# Patient Record
Sex: FEMALE | Race: WHITE
[De-identification: names, ages, dates, MRNs, and addresses within clinical notes are randomized per-mention and may not be internally consistent; named-entity substitution may affect disease eponyms.]

---

## 2018-01-17 ENCOUNTER — HOSPITAL ENCOUNTER (OUTPATIENT)
Dept: HOSPITAL 76 - EMS | Age: 78
Discharge: ANOTHER HOSPITAL | End: 2018-01-17
Attending: SURGERY
Payer: MEDICARE

## 2018-01-17 ENCOUNTER — HOSPITAL ENCOUNTER (INPATIENT)
Dept: HOSPITAL 76 - ED | Age: 78
LOS: 4 days | DRG: 192 | End: 2018-01-21
Attending: FAMILY MEDICINE | Admitting: FAMILY MEDICINE
Payer: MEDICARE

## 2018-01-17 DIAGNOSIS — J44.1: ICD-10-CM

## 2018-01-17 DIAGNOSIS — F17.210: ICD-10-CM

## 2018-01-17 DIAGNOSIS — R06.09: ICD-10-CM

## 2018-01-17 DIAGNOSIS — I48.91: ICD-10-CM

## 2018-01-17 DIAGNOSIS — Z66: ICD-10-CM

## 2018-01-17 DIAGNOSIS — F41.8: ICD-10-CM

## 2018-01-17 DIAGNOSIS — Z79.51: ICD-10-CM

## 2018-01-17 DIAGNOSIS — J44.9: Primary | ICD-10-CM

## 2018-01-17 DIAGNOSIS — R06.02: Primary | ICD-10-CM

## 2018-01-17 DIAGNOSIS — Z79.899: ICD-10-CM

## 2018-01-17 LAB
ALBUMIN DIAFP-MCNC: 4 G/DL (ref 3.2–5.5)
ALBUMIN/GLOB SERPL: 1.3 {RATIO} (ref 1–2.2)
ALP SERPL-CCNC: 88 IU/L (ref 42–121)
ALT SERPL W P-5'-P-CCNC: 68 IU/L (ref 10–60)
ANION GAP SERPL CALCULATED.4IONS-SCNC: 12 MMOL/L (ref 6–13)
ARTERIAL PATENCY WRIST A: POSITIVE
ARTERIAL PATENCY WRIST A: POSITIVE
AST SERPL W P-5'-P-CCNC: 84 IU/L (ref 10–42)
BASE EXCESS BLDMV CALC-SCNC: -3.9 MMOL/L (ref -2–3)
BASE EXCESS BLDMV CALC-SCNC: -5.2 MMOL/L (ref -2–3)
BASOPHILS NFR BLD AUTO: 0.2 10^3/UL (ref 0–0.1)
BASOPHILS NFR BLD AUTO: 2.5 %
BILIRUB BLD-MCNC: 0.5 MG/DL (ref 0.2–1)
BUN SERPL-MCNC: 13 MG/DL (ref 6–20)
CALCIUM UR-MCNC: 9 MG/DL (ref 8.5–10.3)
CHLORIDE SERPL-SCNC: 99 MMOL/L (ref 101–111)
CO2 BLDA CALC-SCNC: 27.6 MMOL/L (ref 21–29)
CO2 BLDA CALC-SCNC: 31.5 MMOL/L (ref 21–29)
CO2 SERPL-SCNC: 27 MMOL/L (ref 21–32)
CREAT SERPLBLD-SCNC: 0.7 MG/DL (ref 0.4–1)
DEPRECATED HCO3 PLAS-SCNC: 25.5 MMOL/L (ref 22–26)
DEPRECATED HCO3 PLAS-SCNC: 28.3 MMOL/L (ref 22–26)
EOSINOPHIL # BLD AUTO: 0.1 10^3/UL (ref 0–0.7)
EOSINOPHIL NFR BLD AUTO: 0.6 %
ERYTHROCYTE [DISTWIDTH] IN BLOOD BY AUTOMATED COUNT: 13.7 % (ref 12–15)
GFRSERPLBLD MDRD-ARVRAT: 81 ML/MIN/{1.73_M2} (ref 89–?)
GLOBULIN SER-MCNC: 3.2 G/DL (ref 2.1–4.2)
GLUCOSE SERPL-MCNC: 150 MG/DL (ref 70–100)
HGB UR QL STRIP: 15.2 G/DL (ref 12–16)
LIPASE SERPL-CCNC: 15 U/L (ref 22–51)
LYMPHOCYTES # SPEC AUTO: 1.1 10^3/UL (ref 1.5–3.5)
LYMPHOCYTES NFR BLD AUTO: 12.2 %
MCH RBC QN AUTO: 31.7 PG (ref 27–31)
MCHC RBC AUTO-ENTMCNC: 31.9 G/DL (ref 32–36)
MCV RBC AUTO: 99.3 FL (ref 81–99)
MONOCYTES # BLD AUTO: 0.4 10^3/UL (ref 0–1)
MONOCYTES NFR BLD AUTO: 4.8 %
NEUTROPHILS # BLD AUTO: 7 10^3/UL (ref 1.5–6.6)
NEUTROPHILS # SNV AUTO: 8.8 X10^3/UL (ref 4.8–10.8)
NEUTROPHILS NFR BLD AUTO: 79.9 %
PCO2 TEMP ADJ BLDCOA: 103 MMHG (ref 34–45)
PCO2 TEMP ADJ BLDCOA: 67 MMHG (ref 34–45)
PDW BLD AUTO: 7.7 FL (ref 7.9–10.8)
PH TEMP ADJ BLDA: 7.06 [PH] (ref 7.35–7.45)
PH TEMP ADJ BLDA: 7.2 [PH] (ref 7.35–7.45)
PLATELET # BLD: 187 10^3/UL (ref 130–450)
PO2 TEMP ADJ BLDCOA: 144 MMHG (ref 80–100)
PO2 TEMP ADJ BLDCOA: 77 MMHG (ref 80–100)
PROT SPEC-MCNC: 7.2 G/DL (ref 6.7–8.2)
RBC MAR: 4.8 10^6/UL (ref 4.2–5.4)
SAO2 % BLDA FROM PO2: 91 % (ref 94–98)
SAO2 % BLDA FROM PO2: 99 % (ref 94–98)
SODIUM SERPLBLD-SCNC: 138 MMOL/L (ref 135–145)

## 2018-01-17 PROCEDURE — 96367 TX/PROPH/DG ADDL SEQ IV INF: CPT

## 2018-01-17 PROCEDURE — 83615 LACTATE (LD) (LDH) ENZYME: CPT

## 2018-01-17 PROCEDURE — 36415 COLL VENOUS BLD VENIPUNCTURE: CPT

## 2018-01-17 PROCEDURE — 96361 HYDRATE IV INFUSION ADD-ON: CPT

## 2018-01-17 PROCEDURE — 99284 EMERGENCY DEPT VISIT MOD MDM: CPT

## 2018-01-17 PROCEDURE — 87150 DNA/RNA AMPLIFIED PROBE: CPT

## 2018-01-17 PROCEDURE — 84100 ASSAY OF PHOSPHORUS: CPT

## 2018-01-17 PROCEDURE — 80048 BASIC METABOLIC PNL TOTAL CA: CPT

## 2018-01-17 PROCEDURE — 96375 TX/PRO/DX INJ NEW DRUG ADDON: CPT

## 2018-01-17 PROCEDURE — 84484 ASSAY OF TROPONIN QUANT: CPT

## 2018-01-17 PROCEDURE — 85025 COMPLETE CBC W/AUTO DIFF WBC: CPT

## 2018-01-17 PROCEDURE — 36600 WITHDRAWAL OF ARTERIAL BLOOD: CPT

## 2018-01-17 PROCEDURE — 80053 COMPREHEN METABOLIC PANEL: CPT

## 2018-01-17 PROCEDURE — 83735 ASSAY OF MAGNESIUM: CPT

## 2018-01-17 PROCEDURE — 81001 URINALYSIS AUTO W/SCOPE: CPT

## 2018-01-17 PROCEDURE — 82310 ASSAY OF CALCIUM: CPT

## 2018-01-17 PROCEDURE — 94640 AIRWAY INHALATION TREATMENT: CPT

## 2018-01-17 PROCEDURE — 71045 X-RAY EXAM CHEST 1 VIEW: CPT

## 2018-01-17 PROCEDURE — 87086 URINE CULTURE/COLONY COUNT: CPT

## 2018-01-17 PROCEDURE — 82803 BLOOD GASES ANY COMBINATION: CPT

## 2018-01-17 PROCEDURE — 93005 ELECTROCARDIOGRAM TRACING: CPT

## 2018-01-17 PROCEDURE — 83690 ASSAY OF LIPASE: CPT

## 2018-01-17 PROCEDURE — 96365 THER/PROPH/DIAG IV INF INIT: CPT

## 2018-01-17 PROCEDURE — 81003 URINALYSIS AUTO W/O SCOPE: CPT

## 2018-01-17 PROCEDURE — 94660 CPAP INITIATION&MGMT: CPT

## 2018-01-17 PROCEDURE — 83880 ASSAY OF NATRIURETIC PEPTIDE: CPT

## 2018-01-17 RX ADMIN — IPRATROPIUM BROMIDE PRN MG: 0.5 SOLUTION RESPIRATORY (INHALATION) at 16:45

## 2018-01-17 RX ADMIN — POTASSIUM CHLORIDE, DEXTROSE MONOHYDRATE AND SODIUM CHLORIDE SCH MLS/HR: 150; 5; 450 INJECTION, SOLUTION INTRAVENOUS at 13:56

## 2018-01-17 RX ADMIN — POTASSIUM CHLORIDE, DEXTROSE MONOHYDRATE AND SODIUM CHLORIDE SCH MLS/HR: 150; 5; 450 INJECTION, SOLUTION INTRAVENOUS at 23:50

## 2018-01-17 RX ADMIN — SODIUM CHLORIDE, PRESERVATIVE FREE SCH ML: 5 INJECTION INTRAVENOUS at 14:02

## 2018-01-17 RX ADMIN — METHYLPREDNISOLONE SODIUM SUCCINATE SCH MG: 40 INJECTION, POWDER, FOR SOLUTION INTRAMUSCULAR; INTRAVENOUS at 18:31

## 2018-01-17 RX ADMIN — SODIUM CHLORIDE, PRESERVATIVE FREE SCH ML: 5 INJECTION INTRAVENOUS at 22:22

## 2018-01-17 RX ADMIN — METHYLPREDNISOLONE SODIUM SUCCINATE SCH MG: 40 INJECTION, POWDER, FOR SOLUTION INTRAMUSCULAR; INTRAVENOUS at 23:50

## 2018-01-17 NOTE — XRAY REPORT
EXAM: 

CHEST RADIOGRAPHY

 

EXAM DATE: 1/17/2018 10:58 AM.

 

CLINICAL HISTORY: Short of breath

 

COMPARISON: Chest CT 07/24/2015

 

TECHNIQUE: 1 view.

 

FINDINGS:

Lungs/Pleura: Nonspecific increase in interstitial markings. Hyperexpanded lungs. Faint nodular densi
ty right midlung projected over the sixth anterior rib. Question nodular density over the left sevent
h anterior rib.

 

Mediastinum: Within exam limitations, the cardiomediastinal contour is normal.

 

Other: Question prior left breast surgery

 

IMPRESSION: Hyperexpanded lungs consistent with COPD. Nonspecific increase in interstitial markings. 
Question bilateral lung nodules. Suggest repeat 2 view chest x-ray with bilateral nipple markers

 

[facility]

Referring Provider Line: [phone]

 

SITE ID: [id number]

## 2018-01-17 NOTE — ED PHYSICIAN DOCUMENTATION
PD HPI DYSPNEA





- Stated complaint


Stated Complaint: SOA





- Chief complaint


Chief Complaint: Resp





- History obtained from


History obtained from: Patient, EMS





- History of Present Illness


Timing - onset: How many days ago (4)


Timing - onset during: Rest


Timing - duration: Days (4)


Timing - details: Gradual onset, Still present


Inciting event(s): URI


Improved by: O2, Inhaler/neb, Rest


Worsened by: Exertion, Coughing


Associated symptoms: Cough, Wheezing, Chest pain / discomfort


Similar symptoms before: Diagnosis (COPD)


Recently seen: Not recently seen





- Additional information


Additional information: 





77-year-old female with a history of COPD maintained on Spiriva and Advair has 

developed increasing shortness of breath over the past 4 days with a URI.  She 

is coughing up some phlegm that is tinged with yellow and she has not had fever.





Review of Systems


Constitutional: reports: Fatigue.  denies: Fever


Eyes: denies: Decreased vision


Ears: denies: Ear pain


Nose: reports: Rhinorrhea / runny nose, Congestion


Throat: denies: Sore throat


Cardiac: reports: Chest pain / pressure.  denies: Palpitations


Respiratory: reports: Dyspnea, Cough, Wheezing


GI: denies: Abdominal Pain, Nausea, Vomiting


: denies: Dysuria, Frequency


Skin: denies: Rash


Musculoskeletal: denies: Neck pain, Back pain, Extremity pain





PD PAST MEDICAL HISTORY





- Present Medications


Home Medications: 


 Ambulatory Orders











 Medication  Instructions  Recorded  Confirmed


 


Citalopram Hydrobromide [Celexa] 20 mg PO DAILY 01/17/18 01/17/18


 


Fluticasone/Salmeterol [Advair 1 puffs PO BID 01/17/18 01/17/18





500-50 Diskus]   


 


Ipratropium Bromide [Atrovent Hfa] 2 puffs INH Q6H 01/17/18 01/17/18


 


LORazepam [Lorazepam] 0.5 - 1 mg PO QPM PRN 01/17/18 01/17/18


 


Tiotropium Bromide [Spiriva] 1 puffs PO DAILY 01/17/18 01/17/18














- Allergies


Allergies/Adverse Reactions: 


 Allergies











Allergy/AdvReac Type Severity Reaction Status Date / Time


 


codeine Allergy Unknown Headache Verified 01/17/18 09:56


 


diazepam [From Valium] Allergy  Respiratory Verified 01/17/18 09:56














PD ED PE NORMAL





- Vitals


Vital signs reviewed: Yes (Tachycardic tachypneic and hypertensive)





- General


General: Well developed/nourished, Other (Thin 77-year-old female sitting 

upright tachypneic with a oxygen mask in place appears anxious.)





- HEENT


HEENT: Atraumatic, PERRL, EOMI, Other (Both TMs are mildly inflamed with some 

distortion of the landmarks.)





- Neck


Neck: Supple, no meningeal sign, No bony TTP





- Cardiac


Cardiac: Other (Tachycardic with 1 out of 6 holosystolic murmur at the left 

sternal border)





- Respiratory


Respiratory: Other (Tachypneic at rest with diminished breath sounds and 

wheezes throughout high-pitched.)





- Abdomen


Abdomen: Soft, Non tender





- Back


Back: No CVA TTP, No spinal TTP





- Derm


Derm: Normal color, Warm and dry, No rash





- Extremities


Extremities: No deformity, No edema





- Neuro


Neuro: No motor deficit, No sensory deficit


Eye Opening: Spontaneous


Motor: Obeys Commands


Verbal: Oriented


GCS Score: 15





- Psych


Psych: Normal mood, Normal affect





Results





- Vitals


Vitals: 


 Vital Signs - 24 hr











  01/17/18 01/17/18 01/17/18





  09:48 11:02 11:04


 


Temperature 36.8 C  


 


Heart Rate 129 H 139 H 105 H


 


Respiratory 28 H 26 H 29 H





Rate   


 


Blood Pressure 142/109 H 133/77 H 120/72


 


O2 Saturation 100 94 92














  01/17/18 01/17/18





  12:06 12:30


 


Temperature  


 


Heart Rate 102 H 97


 


Respiratory 25 H 17





Rate  


 


Blood Pressure 84/60 L 83/53 L


 


O2 Saturation 96 98








 Oxygen











O2 Source                      Room air

















- EKG (time done)


  ** 1045


Rate: Rate (enter#) (150)


Rhythm: SVT


QRS: Low voltage


Ischemia: Q waves


Compare to prior EKG: Old EKG unavailable


Computer interpretation: Disagree with computer (I do not see run of [place])





- Labs


Labs: 


 Laboratory Tests











  01/17/18 01/17/18 01/17/18





  10:05 10:05 10:05


 


WBC  8.8  


 


RBC  4.80  


 


Hgb  15.2  


 


Hct  47.7 H  


 


MCV  99.3 H  


 


MCH  31.7 H  


 


MCHC  31.9 L  


 


RDW  13.7  


 


Plt Count  187  


 


MPV  7.7 L  


 


Neut #  7.0 H  


 


Lymph #  1.1 L  


 


Mono #  0.4  


 


Eos #  0.1  


 


Baso #  0.2 H  


 


Absolute Nucleated RBC  0.01  


 


Nucleated RBC %  0.1  


 


Bld Gas Analysis Time   


 


Sample Site   


 


ABG pH   


 


ABG pCO2   


 


ABG pO2   


 


ABG HCO3   


 


ABG Total CO2   


 


ABG O2 Saturation   


 


ABG Oximetry Spot Check   


 


ABG Base Excess   


 


Bandar Test   


 


O2 Delivery Device   


 


FiO2   


 


EPAP   


 


IPAP   


 


Sodium   138 


 


Potassium   4.5 


 


Chloride   99 L 


 


Carbon Dioxide   27 


 


Anion Gap   12.0 


 


BUN   13 


 


Creatinine   0.7 


 


Estimated GFR (MDRD)   81 L 


 


Glucose   150 H 


 


Calcium   9.0 


 


Total Bilirubin   0.5 


 


AST   84 H 


 


ALT   68 H 


 


Alkaline Phosphatase   88 


 


Troponin I    0.06


 


B-Natriuretic Peptide   


 


Total Protein   7.2 


 


Albumin   4.0 


 


Globulin   3.2 


 


Albumin/Globulin Ratio   1.3 


 


Lipase   15 L 














  01/17/18 01/17/18 01/17/18





  10:05 11:25 12:40


 


WBC   


 


RBC   


 


Hgb   


 


Hct   


 


MCV   


 


MCH   


 


MCHC   


 


RDW   


 


Plt Count   


 


MPV   


 


Neut #   


 


Lymph #   


 


Mono #   


 


Eos #   


 


Baso #   


 


Absolute Nucleated RBC   


 


Nucleated RBC %   


 


Bld Gas Analysis Time   1130  1252


 


Sample Site   RIGHT RADIAL  RIGHT RADIAL


 


ABG pH   7.06 L*  7.20 L*


 


ABG pCO2   103 H*  67 H*


 


ABG pO2   77 L  144 H


 


ABG HCO3   28.3 H  25.5


 


ABG Total CO2   31.5 H  27.6


 


ABG O2 Saturation   91 L  99 H


 


ABG Oximetry Spot Check   92  98


 


ABG Base Excess   -5.2 L  -3.9 L


 


Bandar Test   POSITIVE  POSITIVE


 


O2 Delivery Device   NASAL CANNULA  BiPAP


 


FiO2    40.00


 


EPAP    6


 


IPAP    16


 


Sodium   


 


Potassium   


 


Chloride   


 


Carbon Dioxide   


 


Anion Gap   


 


BUN   


 


Creatinine   


 


Estimated GFR (MDRD)   


 


Glucose   


 


Calcium   


 


Total Bilirubin   


 


AST   


 


ALT   


 


Alkaline Phosphatase   


 


Troponin I   


 


B-Natriuretic Peptide  43  


 


Total Protein   


 


Albumin   


 


Globulin   


 


Albumin/Globulin Ratio   


 


Lipase   














- Rads (name of study)


  ** 1 view chest


Radiology: Prelim report reviewed (Impression: Hyperexpanded lungs consistent 

with COPD.  Nonspecific increase in interstitial markings.  Question bilateral 

lung nodules.  Suggest repeat two-view chest x-ray with bilateral nipple 

markers.), EMP read indepedently, See rad report





PD MEDICAL DECISION MAKING





- ED course


Complexity details: reviewed old records, reviewed results, re-evaluated patient

, considered differential, d/w patient, d/w family


ED course: 





69-year-old female with history of COPD who is never been admitted to the 

hospital for this previously has developed acute dyspnea and is struggling for 

breath in the emergency department.  She has atrial fibrillation with rapid 

ventricular response and tight wheezes throughout.  She is administered 

dexamethasone DuoNeb Xopenex morphine Ativan and magnesium.  She is 

administered diltiazem as well.  She does have some improvement but remains 

critically short of breath. Her arterial blood gas indicates significant CO2 

retention and intubation is recommended.  The patient is trialed on BiPAP prior 

to intubation.  Dr. Chapa is consulted in the case and will admit the patient 

into the hospital.





Departure





- Departure


Disposition: [facility] DC/De[facility]


Clinical Impression: 


 Severe chronic obstructive pulmonary disease





Condition: Critical


Discharge Date/Time: 01/17/18 13:23

## 2018-01-17 NOTE — HISTORY & PHYSICAL EXAMINATION
Chief Complaint





- Chief Complaint


Chief Complaint: dyspnea





History of Present Illness





- Admitted From


Admitted From:: home





- History of Present Illness


HPI Comment/Other: 





Ms. Gisela Quarles is a 77-year-old female with a history of COPD who has been 

declining steadily over the last week or so according to her good friend.  She 

does not typically wear oxygen at home and has been struggling with a cold 

which began a few days ago.  Over the last 24 to  hrs. it has become much 

more severe and this morning she called her friend and asked her to take her to 

the emergency room because she could not breathe.Her friend also notes that she 

has had unexplained weight loss over the last 6 months, greater than 10% of the 

total body weight, and was exhibiting pursed lip breathing when she found her 

this morning.  She is also no longer talking and has become very severely 

dyspneic with any type of exertion.





History





- Past Medical History


Cardiovascular: reports: None


Respiratory: reports: COPD


Neuro: reports: None


Endocrine/Autoimmune: reports: None


GI: reports: None


GYN: reports: None


: reports: None


HEENT: reports: None


Psych: reports: Depression, Anxiety


Musculoskeletal: reports: None


Derm: reports: None


MRSA Hx?: No





- Past Surgical History


/GYN: reports: Hysterectomy


Other past surgical history: Facelift, Both thumb joints are prosthetic





- Family & Social History


Family History: Mother:  (Mother  age 100 of natural causes. Father'

s cause of death is unknown.  Brother  of injury sustained in an MVA), 

Father: , Brother: 


Living arrangement: At home


Living Situation: Alone





- Substance History


Use: Uses substance without health or social issues: Alcohol


Dependence: Experiences withdrawal or developed tolerances: Tobacco


Tobacco Details: Cigarettes





- POLST


Patient has POLST: Yes


POLST Status: DNR (Per friend/caregiver)





Meds/Allgy





- Home Medications


Home Medications: 


 Ambulatory Orders











 Medication  Instructions  Recorded  Confirmed


 


Citalopram Hydrobromide [Celexa] 20 mg PO DAILY 18


 


Fluticasone/Salmeterol [Advair 1 puffs PO BID 18





500-50 Diskus]   


 


Ipratropium Bromide [Atrovent Hfa] 2 puffs INH Q6H 18


 


LORazepam [Lorazepam] 0.5 - 1 mg PO QPM PRN 18


 


Tiotropium Bromide [Spiriva] 1 puffs PO DAILY 18














- Allergies


Allergies/Adverse Reactions: 


 Allergies











Allergy/AdvReac Type Severity Reaction Status Date / Time


 


codeine Allergy Unknown Headache Verified 18 09:56


 


diazepam [From Valium] Allergy  Respiratory Verified 18 09:56














Review of Systems





- Constitutional


Constitutional: denies: Fatigue





- Eyes


Eyes: denies: Pain, Irritation, Blurred vision, Vision loss, Dipolpia





- Ears, Nose & Throat


Ears, Nose & Throat: denies: Ear pain, Hearing loss, Hearing aids, Tinnitus, 

Vertigo, Nasal pain, Nasal discharge





- Cardiovascular


Cariovascular: denies: Palpitations, Chest pain, Edema, Syncope





- Respiratory


Respiratory: reports: Cough, Sputum production, Wheezing, SOB at rest, SOB with 

exertion.  denies: Hemoptysis





- Gastrointestinal


Gastrointestinal: denies: Abdominal pain, Abdominal distention, Constipation, 

Diarrhea, Change in bowel habits, Rectal bleeding





- Genitourinary


Genitourinary: denies: Dysuria, Frequency, Urgency, Hematuria





- Musculoskeletal


Musculoskeletal: denies: Muscle pain, Back pain, Muscle aches, Stiffness





- Integumentary


Integumentary: denies: Rash, Pruritis, Lesions, Dryness





- Neurological


Neurological: denies: General weakness, Focal weakness, Headache, Dizziness, 

Numbness





- Psychiatric


Psychiatric: denies: Depression, Anxiety, Suicidal, Hallucinations





- Endocrine


Endocrine: denies: Polyuria, Polydypsia, Polyphagia





- Hematologic/Lymphatic


Hematologic/Lymphatic: denies: Anemia, Bruising, Petechiae, Lymphadenopathy





- All Other Systems


All Other Systems: reports: Reviewed and negative





Exam





- Vital Signs


Reviewed Vital Signs: Yes


Vital Signs: 





 Vital Signs x48h











  Temp Pulse Resp BP Pulse Ox


 


 18 12:06   102 H  25 H  84/60 L  96


 


 18 11:04   105 H  29 H  120/72  92


 


 18 11:02   139 H  26 H  133/77 H  94


 


 18 09:48  36.8 C  129 H  28 H  142/109 H  100














- Physical Exam


General Appearance: positive: No acute distress, Alert


Eyes Bilateral: positive: Normal inspection, PERRL, EOMI


ENT: positive: ENT inspection nml, Pharynx nml, No signs of dehydration


Neck: positive: Nml inspection, Thyroid nml, No JVD, Trachea midline.  negative

: Thyromegaly


Respiratory: positive: Chest non-tender, Other (Positive significant tachypnea 

and respiratory failure).  negative: Wheezes, Rales, Rhonchi


Cardiovascular: positive: No murmur, No gallop, Tachycardia


Peripheral Pulses: positive: 1+


Abdomen: positive: Non-tender, No organomegaly, Nml bowel sounds, No 

distention.  negative: Guarding, Rebound, Hepatomegaly, Splenomegaly


Back: positive: Nml inspection.  negative: CVA tenderness (R), CVA tenderness (L

)


Skin: positive: Color nml, No rash, Warm, Dry, Cyanosis, Pallor.  negative: 

Skin rash, Laceration (cm)


Extremities: positive: Non-tender, Nml appearance, No pedal edema.  negative: 

Joint swelling


Neurologic/Psychiatric: positive: Disoriented to person, Disoriented to place, 

Disoriented to time, Weakness.  negative: Oriented x3





Conclusion/Plan





- Problem List


(1) Severe chronic obstructive pulmonary disease


Conclusion/Plan: 


The patient is currently on BiPAP with an FiO2 40%.  Her blood gases are 

showing significant improvement over just the course of about 45 minutes and 

she appears to be comfortable.  We will continue with the current regimen and 

settings and will wean her off of the BiPAP as soon as she starts to show signs 

that this is possible.








(2) Depression with anxiety


Conclusion/Plan: 


We will continue the patient on her citalopram and lorazepam and address any 

other issues as they arise.








- Lab Results


Lab results reviewed: Yes


Fish Bones: 


 18 10:05





 18 10:05





- Diagnostic Imaging Results


Diagnostic Imaging Results Comments: 





EXAM: 


CHEST RADIOGRAPHY 





EXAM DATE: 2018 10:58 AM. 





CLINICAL HISTORY: Short of breath 





COMPARISON: Chest CT 2015 





TECHNIQUE: 1 view. 





FINDINGS: 


Lungs/Pleura: Nonspecific increase in interstitial markings. Hyperexpanded 

lungs. Faint nodular 


density right midlung projected over the sixth anterior rib. Question nodular 

density over the left 


seventh anterior rib. 





Mediastinum: Within exam limitations, the cardiomediastinal contour is normal. 





Other: Question prior left breast surgery 





IMPRESSION: Hyperexpanded lungs consistent with COPD. Nonspecific increase in 

interstitial 


markings. Question bilateral lung nodules. Suggest repeat 2 view chest x-ray 

with bilateral nipple 


markers 











Core Measures





- Anticipated LOS


I expect patient to be DC'd or transferred within 96 hours.: Yes





- DVT/VTE - Prophylaxis


VTE/DVT Device ordered at admit?: Yes

## 2018-01-18 LAB
ANION GAP SERPL CALCULATED.4IONS-SCNC: 9 MMOL/L (ref 6–13)
ARTERIAL PATENCY WRIST A: POSITIVE
BASE EXCESS BLDMV CALC-SCNC: -1.9 MMOL/L (ref -2–3)
BUN SERPL-MCNC: 16 MG/DL (ref 6–20)
CALCIUM UR-MCNC: 8.4 MG/DL (ref 8.5–10.3)
CHLORIDE SERPL-SCNC: 101 MMOL/L (ref 101–111)
CLARITY UR REFRACT.AUTO: CLEAR
CO2 BLDA CALC-SCNC: 26.3 MMOL/L (ref 21–29)
CO2 SERPL-SCNC: 22 MMOL/L (ref 21–32)
CREAT SERPLBLD-SCNC: 0.7 MG/DL (ref 0.4–1)
DEPRECATED HCO3 PLAS-SCNC: 24.8 MMOL/L (ref 22–26)
GFRSERPLBLD MDRD-ARVRAT: 81 ML/MIN/{1.73_M2} (ref 89–?)
GLUCOSE SERPL-MCNC: 187 MG/DL (ref 70–100)
GLUCOSE UR QL STRIP.AUTO: NEGATIVE MG/DL
KETONES UR QL STRIP.AUTO: NEGATIVE MG/DL
MAGNESIUM SERPL-MCNC: 2.2 MG/DL (ref 1.7–2.8)
NITRITE UR QL STRIP.AUTO: NEGATIVE
PCO2 TEMP ADJ BLDCOA: 50 MMHG (ref 34–45)
PH TEMP ADJ BLDA: 7.32 [PH] (ref 7.35–7.45)
PH UR STRIP.AUTO: 6 PH (ref 5–7.5)
PHOSPHATE BLD-MCNC: 3.5 MG/DL (ref 2.5–4.6)
PO2 TEMP ADJ BLDCOA: 138 MMHG (ref 80–100)
PROT UR STRIP.AUTO-MCNC: 30 MG/DL
RBC # UR STRIP.AUTO: (no result) /UL
RBC # URNS HPF: (no result) /HPF (ref 0–5)
SAO2 % BLDA FROM PO2: 99 % (ref 94–98)
SODIUM SERPLBLD-SCNC: 132 MMOL/L (ref 135–145)
SP GR UR STRIP.AUTO: >=1.03 (ref 1–1.03)
SQUAMOUS URNS QL MICRO: (no result)
UROBILINOGEN UR QL STRIP.AUTO: (no result) E.U./DL
UROBILINOGEN UR STRIP.AUTO-MCNC: NEGATIVE MG/DL

## 2018-01-18 RX ADMIN — METHYLPREDNISOLONE SODIUM SUCCINATE SCH MG: 40 INJECTION, POWDER, FOR SOLUTION INTRAMUSCULAR; INTRAVENOUS at 06:12

## 2018-01-18 RX ADMIN — MORPHINE SULFATE PRN MG: 2 INJECTION, SOLUTION INTRAMUSCULAR; INTRAVENOUS at 22:34

## 2018-01-18 RX ADMIN — SODIUM CHLORIDE, PRESERVATIVE FREE SCH ML: 5 INJECTION INTRAVENOUS at 06:12

## 2018-01-18 RX ADMIN — IPRATROPIUM BROMIDE AND ALBUTEROL SULFATE SCH ML: 2.5; .5 SOLUTION RESPIRATORY (INHALATION) at 17:50

## 2018-01-18 RX ADMIN — METHYLPREDNISOLONE SODIUM SUCCINATE SCH MG: 40 INJECTION, POWDER, FOR SOLUTION INTRAMUSCULAR; INTRAVENOUS at 11:50

## 2018-01-18 RX ADMIN — METHYLPREDNISOLONE SODIUM SUCCINATE SCH MG: 40 INJECTION, POWDER, FOR SOLUTION INTRAMUSCULAR; INTRAVENOUS at 17:04

## 2018-01-18 RX ADMIN — SODIUM CHLORIDE, PRESERVATIVE FREE PRN ML: 5 INJECTION INTRAVENOUS at 07:14

## 2018-01-18 RX ADMIN — MORPHINE SULFATE PRN MG: 2 INJECTION, SOLUTION INTRAMUSCULAR; INTRAVENOUS at 18:41

## 2018-01-18 RX ADMIN — NICOTINE SCH: 14 PATCH TRANSDERMAL at 09:24

## 2018-01-18 RX ADMIN — SODIUM CHLORIDE, PRESERVATIVE FREE PRN ML: 5 INJECTION INTRAVENOUS at 11:50

## 2018-01-18 RX ADMIN — POTASSIUM CHLORIDE, DEXTROSE MONOHYDRATE AND SODIUM CHLORIDE SCH MLS/HR: 150; 5; 450 INJECTION, SOLUTION INTRAVENOUS at 09:49

## 2018-01-18 RX ADMIN — SODIUM CHLORIDE, PRESERVATIVE FREE SCH ML: 5 INJECTION INTRAVENOUS at 22:35

## 2018-01-18 RX ADMIN — CHLORHEXIDINE GLUCONATE SCH: 1.2 SOLUTION ORAL at 20:26

## 2018-01-18 RX ADMIN — MORPHINE SULFATE PRN MG: 2 INJECTION, SOLUTION INTRAMUSCULAR; INTRAVENOUS at 07:14

## 2018-01-18 RX ADMIN — IPRATROPIUM BROMIDE AND ALBUTEROL SULFATE SCH ML: 2.5; .5 SOLUTION RESPIRATORY (INHALATION) at 13:25

## 2018-01-18 RX ADMIN — MORPHINE SULFATE PRN MG: 2 INJECTION, SOLUTION INTRAMUSCULAR; INTRAVENOUS at 15:23

## 2018-01-18 RX ADMIN — MORPHINE SULFATE PRN MG: 2 INJECTION, SOLUTION INTRAMUSCULAR; INTRAVENOUS at 17:19

## 2018-01-18 RX ADMIN — IPRATROPIUM BROMIDE AND ALBUTEROL SULFATE SCH ML: 2.5; .5 SOLUTION RESPIRATORY (INHALATION) at 23:15

## 2018-01-18 RX ADMIN — MORPHINE SULFATE PRN MG: 2 INJECTION, SOLUTION INTRAMUSCULAR; INTRAVENOUS at 02:48

## 2018-01-18 RX ADMIN — IPRATROPIUM BROMIDE PRN MG: 0.5 SOLUTION RESPIRATORY (INHALATION) at 13:22

## 2018-01-18 RX ADMIN — SODIUM CHLORIDE, PRESERVATIVE FREE SCH ML: 5 INJECTION INTRAVENOUS at 13:25

## 2018-01-18 RX ADMIN — SODIUM CHLORIDE SCH MLS/HR: 9 INJECTION, SOLUTION INTRAVENOUS at 13:25

## 2018-01-18 NOTE — PROVIDER PROGRESS NOTE
Subjective





- Prog Note Date


Prog Note Date: 01/18/18


Prog Note Time: 12:41





- Subjective


Pt reports feeling: Improved (The patient is breathing easier but had to go 

back on the BiPAP after being off it overnight.  She is much more awake and 

alert than on admission and is able to carry on a conversation.  She was unable 

to come off of the BiPAP for more than a minute or 2 earlier when she tried to 

have some lunch.)





Objective





- Vital Signs/Intake & Output


Reviewed Vital Signs: Yes


Vital Signs: 


 Vital Signs x48h











  Temp Pulse Pulse Resp BP Pulse Ox


 


 01/18/18 12:21   100    


 


 01/18/18 12:00  36.9 C   94  19  93/66  98


 


 01/18/18 11:00    88  12  92/65  99


 


 01/18/18 10:52   88    


 


 01/18/18 10:00    94  21  101/73  98


 


 01/18/18 09:00    104 H  22  108/70  97


 


 01/18/18 08:00  37.1 C  122 H  124 H  25 H  127/86 H  93


 


 01/18/18 07:00    113 H  30 H  96/71  94


 


 01/18/18 06:00    92  20  93/69  97


 


 01/18/18 05:00    94  23  88/65 L  98











Intake & Output: 


 Intake & Output











 01/15/18 01/16/18 01/17/18 01/18/18





 23:59 23:59 23:59 23:59


 


Intake Total   2040.000 1678.334


 


Output Total   125 300


 


Balance   5419.297 9637.334














- Objective


General Appearance: positive: Alert, Mild distress


Eyes Bilateral: positive: Normal inspection, PERRL, EOMI, No lid inflammation, 

Conjunctivae nml


ENT: positive: ENT inspection nml, Pharynx nml, No signs of dehydration


Neck: positive: Nml inspection, Thyroid nml, No JVD, Trachea midline.  negative

: Carotid bruit


Respiratory: positive: Chest non-tender, No respiratory distress, Breath sounds 

nml.  negative: Wheezes, Rales, Rhonchi


Cardiovascular: positive: No murmur, No gallop, Tachycardia.  negative: 

Systolic murmur, Diastolic murmur


Abdomen: positive: Non-tender, No organomegaly, Nml bowel sounds, No 

distention.  negative: Guarding, Rebound


Back: positive: Nml inspection.  negative: CVA tenderness (R), CVA tenderness (L

)


Skin: positive: Color nml, No rash, Warm, Dry.  negative: Cyanosis


Extremities: positive: Non-tender, Full ROM, Nml appearance


Neurologic/Psychiatric: positive: Oriented x3, CN's nml (2-12), Motor nml, 

Sensation nml, Mood/affect nml





- Lab Results


Fish Bones: 


 01/17/18 10:05





 01/18/18 04:43


Other Labs: 


 Lab Results x24hrs











  01/18/18 01/18/18 01/18/18 Range/Units





  10:45 08:45 04:43 


 


Bld Gas Analysis Time  1059    


 


Sample Site  RIGHT RADIAL    


 


ABG pH  7.32 L    (7.35-7.45)  


 


ABG pCO2  50 H    (34-45)  mmHg


 


ABG pO2  138 H    ()  mmHg


 


ABG HCO3  24.8    (22.0-26.0)  mmol/L


 


ABG Total CO2  26.3    (21.0-29.0)  MMOL/L


 


ABG O2 Saturation  99 H    (94-98)  %


 


ABG Base Excess  -1.9    (-2.0-3.0)  mmol/L


 


Bandar Test  POSITIVE    


 


O2 Delivery Device  BiPAP    


 


FiO2  30.00    


 


EPAP  4    cmH2O


 


IPAP  18    cmH2O


 


Sodium     (135-145)  mmol/L


 


Potassium     (3.5-5.0)  mmol/L


 


Chloride     (101-111)  mmol/L


 


Carbon Dioxide     (21-32)  mmol/L


 


Anion Gap     (6-13)  


 


BUN     (6-20)  mg/dL


 


Creatinine     (0.4-1.0)  mg/dL


 


Estimated GFR (MDRD)     (>89)  


 


Glucose     ()  mg/dL


 


Calcium     (8.5-10.3)  mg/dL


 


Phosphorus     (2.5-4.6)  mg/dL


 


Magnesium     (1.7-2.8)  mg/dL


 


Lactate Dehydrogenase    150  ()  IU/L


 


Urine Color   YELLOW   


 


Urine Clarity   CLEAR   (CLEAR)  


 


Urine pH   6.0   (5.0-7.5)  PH


 


Ur Specific Gravity   >=1.030 H   (1.002-1.030)  


 


Urine Protein   30 H   (NEGATIVE)  mg/dL


 


Urine Glucose (UA)   NEGATIVE   (NEGATIVE)  mg/dL


 


Urine Ketones   NEGATIVE   (NEGATIVE)  mg/dL


 


Urine Occult Blood   SMALL H   (NEGATIVE)  


 


Urine Nitrite   NEGATIVE   (NEGATIVE)  


 


Urine Bilirubin   NEGATIVE   (NEGATIVE)  


 


Urine Urobilinogen   0.2 (NORMAL)   (NORMAL)  E.U./dL


 


Ur Leukocyte Esterase   NEGATIVE   (NEGATIVE)  


 


Urine RBC   6-10 H   (0-5)  /HPF


 


Urine WBC   0-3   (0-5)  /HPF


 


Ur Squamous Epith Cells   MOD Squamous H   (<= Few)  


 


Urine Bacteria   Moderate H   (None Seen)  /HPF


 


Ur Microscopic Review   INDICATED   


 


Urine Culture Comments   NOT INDICATED   














  01/18/18 Range/Units





  04:43 


 


Bld Gas Analysis Time   


 


Sample Site   


 


ABG pH   (7.35-7.45)  


 


ABG pCO2   (34-45)  mmHg


 


ABG pO2   ()  mmHg


 


ABG HCO3   (22.0-26.0)  mmol/L


 


ABG Total CO2   (21.0-29.0)  MMOL/L


 


ABG O2 Saturation   (94-98)  %


 


ABG Base Excess   (-2.0-3.0)  mmol/L


 


Bandar Test   


 


O2 Delivery Device   


 


FiO2   


 


EPAP   cmH2O


 


IPAP   cmH2O


 


Sodium  132 L  (135-145)  mmol/L


 


Potassium  5.3 H  (3.5-5.0)  mmol/L


 


Chloride  101  (101-111)  mmol/L


 


Carbon Dioxide  22  (21-32)  mmol/L


 


Anion Gap  9.0  (6-13)  


 


BUN  16  (6-20)  mg/dL


 


Creatinine  0.7  (0.4-1.0)  mg/dL


 


Estimated GFR (MDRD)  81 L  (>89)  


 


Glucose  187 H  ()  mg/dL


 


Calcium  8.4 L  (8.5-10.3)  mg/dL


 


Phosphorus  3.5  (2.5-4.6)  mg/dL


 


Magnesium  2.2  (1.7-2.8)  mg/dL


 


Lactate Dehydrogenase   ()  IU/L


 


Urine Color   


 


Urine Clarity   (CLEAR)  


 


Urine pH   (5.0-7.5)  PH


 


Ur Specific Gravity   (1.002-1.030)  


 


Urine Protein   (NEGATIVE)  mg/dL


 


Urine Glucose (UA)   (NEGATIVE)  mg/dL


 


Urine Ketones   (NEGATIVE)  mg/dL


 


Urine Occult Blood   (NEGATIVE)  


 


Urine Nitrite   (NEGATIVE)  


 


Urine Bilirubin   (NEGATIVE)  


 


Urine Urobilinogen   (NORMAL)  E.U./dL


 


Ur Leukocyte Esterase   (NEGATIVE)  


 


Urine RBC   (0-5)  /HPF


 


Urine WBC   (0-5)  /HPF


 


Ur Squamous Epith Cells   (<= Few)  


 


Urine Bacteria   (None Seen)  /HPF


 


Ur Microscopic Review   


 


Urine Culture Comments   














- Diagnostic Imaging


Diagnostic Imaging Results: positive: Final report reviewed





Assessment/Plan





- Problem List


(1) Severe chronic obstructive pulmonary disease


Impression: 


The patient has improved significantly over the last 24 hours since her 

admission.  She was able to come off of the BiPAP for a few hours last night 

but unfortunately had to go back on this morning and is doing much better since 

she has gone back on.  She thinks that this is more a case of her disease 

getting out of control then a new underlying process, as she has felt this 

coming on yet did nothing to stop or slow it down.








(2) Depression with anxiety


Impression: 


Well-managed, continue present medication regimen.

## 2018-01-19 LAB
ANION GAP SERPL CALCULATED.4IONS-SCNC: 10 MMOL/L (ref 6–13)
ARTERIAL PATENCY WRIST A: POSITIVE
BASE EXCESS BLDMV CALC-SCNC: 1.5 MMOL/L (ref -2–3)
BASOPHILS NFR BLD AUTO: 0 10^3/UL (ref 0–0.1)
BASOPHILS NFR BLD AUTO: 0.1 %
BUN SERPL-MCNC: 18 MG/DL (ref 6–20)
CALCIUM UR-MCNC: 8.7 MG/DL (ref 8.5–10.3)
CALCIUM UR-MCNC: 8.7 MG/DL (ref 8.5–10.3)
CHLORIDE SERPL-SCNC: 97 MMOL/L (ref 101–111)
CO2 BLDA CALC-SCNC: 29.7 MMOL/L (ref 21–29)
CO2 SERPL-SCNC: 25 MMOL/L (ref 21–32)
CREAT SERPLBLD-SCNC: 0.7 MG/DL (ref 0.4–1)
DEPRECATED HCO3 PLAS-SCNC: 28.1 MMOL/L (ref 22–26)
EOSINOPHIL # BLD AUTO: 0 10^3/UL (ref 0–0.7)
EOSINOPHIL NFR BLD AUTO: 0 %
ERYTHROCYTE [DISTWIDTH] IN BLOOD BY AUTOMATED COUNT: 13.1 % (ref 12–15)
GFRSERPLBLD MDRD-ARVRAT: 81 ML/MIN/{1.73_M2} (ref 89–?)
GLUCOSE SERPL-MCNC: 132 MG/DL (ref 70–100)
HGB UR QL STRIP: 12.6 G/DL (ref 12–16)
LYMPHOCYTES # SPEC AUTO: 0.6 10^3/UL (ref 1.5–3.5)
LYMPHOCYTES NFR BLD AUTO: 5.1 %
MAGNESIUM SERPL-MCNC: 2 MG/DL (ref 1.7–2.8)
MCH RBC QN AUTO: 31.8 PG (ref 27–31)
MCHC RBC AUTO-ENTMCNC: 33.6 G/DL (ref 32–36)
MCV RBC AUTO: 94.9 FL (ref 81–99)
MONOCYTES # BLD AUTO: 0.6 10^3/UL (ref 0–1)
MONOCYTES NFR BLD AUTO: 5.1 %
NEUTROPHILS # BLD AUTO: 10.1 10^3/UL (ref 1.5–6.6)
NEUTROPHILS # SNV AUTO: 11.2 X10^3/UL (ref 4.8–10.8)
NEUTROPHILS NFR BLD AUTO: 89.7 %
PCO2 TEMP ADJ BLDCOA: 53 MMHG (ref 34–45)
PDW BLD AUTO: 7.5 FL (ref 7.9–10.8)
PH TEMP ADJ BLDA: 7.35 [PH] (ref 7.35–7.45)
PHOSPHATE BLD-MCNC: 3.4 MG/DL (ref 2.5–4.6)
PLATELET # BLD: 184 10^3/UL (ref 130–450)
PO2 TEMP ADJ BLDCOA: 91 MMHG (ref 80–100)
RBC MAR: 3.96 10^6/UL (ref 4.2–5.4)
SAO2 % BLDA FROM PO2: 97 % (ref 94–98)
SODIUM SERPLBLD-SCNC: 132 MMOL/L (ref 135–145)

## 2018-01-19 RX ADMIN — IPRATROPIUM BROMIDE AND ALBUTEROL SULFATE SCH ML: 2.5; .5 SOLUTION RESPIRATORY (INHALATION) at 08:00

## 2018-01-19 RX ADMIN — SODIUM CHLORIDE, PRESERVATIVE FREE SCH ML: 5 INJECTION INTRAVENOUS at 06:23

## 2018-01-19 RX ADMIN — MORPHINE SULFATE PRN MG: 2 INJECTION, SOLUTION INTRAMUSCULAR; INTRAVENOUS at 15:32

## 2018-01-19 RX ADMIN — METHYLPREDNISOLONE SODIUM SUCCINATE SCH MG: 40 INJECTION, POWDER, FOR SOLUTION INTRAMUSCULAR; INTRAVENOUS at 13:09

## 2018-01-19 RX ADMIN — IPRATROPIUM BROMIDE AND ALBUTEROL SULFATE SCH ML: 2.5; .5 SOLUTION RESPIRATORY (INHALATION) at 19:30

## 2018-01-19 RX ADMIN — MORPHINE SULFATE PRN MG: 2 INJECTION, SOLUTION INTRAMUSCULAR; INTRAVENOUS at 07:51

## 2018-01-19 RX ADMIN — MORPHINE SULFATE PRN MG: 2 INJECTION, SOLUTION INTRAMUSCULAR; INTRAVENOUS at 10:35

## 2018-01-19 RX ADMIN — METHYLPREDNISOLONE SODIUM SUCCINATE SCH MG: 40 INJECTION, POWDER, FOR SOLUTION INTRAMUSCULAR; INTRAVENOUS at 06:23

## 2018-01-19 RX ADMIN — SODIUM CHLORIDE SCH MLS/HR: 9 INJECTION, SOLUTION INTRAVENOUS at 00:13

## 2018-01-19 RX ADMIN — MORPHINE SULFATE PRN MG: 2 INJECTION, SOLUTION INTRAMUSCULAR; INTRAVENOUS at 20:26

## 2018-01-19 RX ADMIN — MORPHINE SULFATE PRN MG: 2 INJECTION, SOLUTION INTRAMUSCULAR; INTRAVENOUS at 03:14

## 2018-01-19 RX ADMIN — CHLORHEXIDINE GLUCONATE SCH ML: 1.2 SOLUTION ORAL at 10:35

## 2018-01-19 RX ADMIN — NICOTINE SCH: 14 PATCH TRANSDERMAL at 10:36

## 2018-01-19 RX ADMIN — METHYLPREDNISOLONE SODIUM SUCCINATE SCH MG: 40 INJECTION, POWDER, FOR SOLUTION INTRAMUSCULAR; INTRAVENOUS at 18:22

## 2018-01-19 RX ADMIN — IPRATROPIUM BROMIDE AND ALBUTEROL SULFATE SCH ML: 2.5; .5 SOLUTION RESPIRATORY (INHALATION) at 11:30

## 2018-01-19 RX ADMIN — IPRATROPIUM BROMIDE AND ALBUTEROL SULFATE SCH ML: 2.5; .5 SOLUTION RESPIRATORY (INHALATION) at 16:00

## 2018-01-19 RX ADMIN — MORPHINE SULFATE PRN MG: 2 INJECTION, SOLUTION INTRAMUSCULAR; INTRAVENOUS at 17:41

## 2018-01-19 RX ADMIN — SODIUM CHLORIDE SCH MLS/HR: 9 INJECTION, SOLUTION INTRAVENOUS at 12:23

## 2018-01-19 RX ADMIN — MORPHINE SULFATE PRN MG: 2 INJECTION, SOLUTION INTRAMUSCULAR; INTRAVENOUS at 13:25

## 2018-01-19 RX ADMIN — METHYLPREDNISOLONE SODIUM SUCCINATE SCH MG: 40 INJECTION, POWDER, FOR SOLUTION INTRAMUSCULAR; INTRAVENOUS at 00:09

## 2018-01-19 RX ADMIN — SODIUM CHLORIDE, PRESERVATIVE FREE SCH: 5 INJECTION INTRAVENOUS at 16:10

## 2018-01-19 RX ADMIN — SODIUM CHLORIDE, PRESERVATIVE FREE PRN ML: 5 INJECTION INTRAVENOUS at 10:37

## 2018-01-19 RX ADMIN — SODIUM CHLORIDE, PRESERVATIVE FREE SCH: 5 INJECTION INTRAVENOUS at 21:06

## 2018-01-19 NOTE — XRAY PRELIMINARY REPORT
Accession: [id number]

Exam: XR CHEST 1 VIEW X-RAY

 

IMPRESSION: 

1. Emphysema. No acute abnormality seen.

 

[facility]

 

SITE ID: [id number]

## 2018-01-19 NOTE — PROVIDER PROGRESS NOTE
Subjective





- Prog Note Date


Prog Note Date: 01/19/18


Prog Note Time: 14:38





- Subjective


Pt reports feeling: Improved (Less shortness of breath, but still BiPAP 

dependant.)





Current Medications





- Current Medications


Current Medications: 


DuoNeb, aspirin, Peridex, Atrovent, Solu-Medrol, morphine, NicoDerm, Compazine, 

sodium chloride








Objective





- Vital Signs/Intake & Output


Reviewed Vital Signs: Yes


Vital Signs: 


 Vital Signs x48h











  Temp Pulse Pulse Resp BP Pulse Ox


 


 01/19/18 14:00    85  22  103/68  96


 


 01/19/18 13:00    91  22  102/76  97


 


 01/19/18 12:00    82  15  105/79  96


 


 01/19/18 11:30   90   11 L  


 


 01/19/18 11:00    88  18  107/70  98


 


 01/19/18 10:00    92  18  108/71  96


 


 01/19/18 09:00    92  15  105/68  96


 


 01/19/18 08:00  97.9 C H  104 H  109 H  24  122/70  94


 


 01/19/18 07:00    86  19  103/69  96











Intake & Output: 


 Intake & Output











 01/16/18 01/17/18 01/18/18 01/19/18





 23:59 23:59 23:59 23:59


 


Intake Total  2040.000 3713.950 2089.907


 


Output Total  125 875 325


 


Balance  1367.970 7664.950 1764.907














- Objective


General Appearance: positive: No acute distress, Alert


Eyes Bilateral: positive: Normal inspection, PERRL, EOMI, No lid inflammation, 

Conjunctivae nml


ENT: positive: ENT inspection nml, Pharynx nml, No signs of dehydration, 

Purulent nasal drainage, Oral lesions


Neck: positive: Nml inspection, Thyroid nml, No JVD, Trachea midline.  negative

: Thyromegaly


Respiratory: positive: Chest non-tender, No respiratory distress, Breath sounds 

nml.  negative: Wheezes, Rales, Rhonchi


Cardiovascular: positive: Regular rate & rhythm, No murmur, No gallop, 

Tachycardia.  negative: Extrasystoles


Abdomen: positive: Non-tender, No organomegaly, Nml bowel sounds, No 

distention.  negative: Tenderness, Guarding, Rebound


Back: positive: Nml inspection.  negative: CVA tenderness (R), CVA tenderness (L

)


Skin: positive: Color nml, No rash, Warm, Dry.  negative: Cyanosis


Extremities: positive: Non-tender, Full ROM, Nml appearance, No pedal edema


Neurologic/Psychiatric: positive: Oriented x3, CN's nml (2-12), Motor nml, 

Sensation nml, Mood/affect nml





- Lab Results


Fish Bones: 


 01/19/18 01:10





 01/19/18 01:10


Other Labs: 


 Lab Results x24hrs











  01/19/18 01/19/18 01/19/18 Range/Units





  06:50 05:25 01:10 


 


WBC     (4.8-10.8)  x10^3/uL


 


RBC     (4.20-5.40)  10^6/uL


 


Hgb     (12.0-16.0)  g/dL


 


Hct     (37.0-47.0)  %


 


MCV     (81.0-99.0)  fL


 


MCH     (27.0-31.0)  pg


 


MCHC     (32.0-36.0)  g/dL


 


RDW     (12.0-15.0)  %


 


Plt Count     (130-450)  10^3/uL


 


MPV     (7.9-10.8)  fL


 


Neut #     (1.5-6.6)  10^3/uL


 


Lymph #     (1.5-3.5)  10^3/uL


 


Mono #     (0.0-1.0)  10^3/uL


 


Eos #     (0.0-0.7)  10^3/uL


 


Baso #     (0.0-0.1)  10^3/uL


 


Absolute Nucleated RBC     x10^3/uL


 


Nucleated RBC %     /100WBC


 


Bld Gas Analysis Time   0535   


 


Sample Site   RIGHT RADIAL   


 


ABG pH   7.35   (7.35-7.45)  


 


ABG pCO2   53 H   (34-45)  mmHg


 


ABG pO2   91   ()  mmHg


 


ABG HCO3   28.1 H   (22.0-26.0)  mmol/L


 


ABG Total CO2   29.7 H   (21.0-29.0)  MMOL/L


 


ABG O2 Saturation   97   (94-98)  %


 


ABG Oximetry Spot Check   97   %


 


ABG Base Excess   1.5   (-2.0-3.0)  mmol/L


 


Bandar Test   POSITIVE   


 


FiO2   25.00   


 


EPAP   4   cmH2O


 


IPAP   18   cmH2O


 


Sodium     (135-145)  mmol/L


 


Potassium     (3.5-5.0)  mmol/L


 


Chloride     (101-111)  mmol/L


 


Carbon Dioxide     (21-32)  mmol/L


 


Anion Gap     (6-13)  


 


BUN     (6-20)  mg/dL


 


Creatinine     (0.4-1.0)  mg/dL


 


Estimated GFR (MDRD)     (>89)  


 


Glucose     ()  mg/dL


 


Calcium    8.7  (8.5-10.3)  mg/dL


 


Ionized Calcium    NO  


 


Phosphorus     (2.5-4.6)  mg/dL


 


Magnesium     (1.7-2.8)  mg/dL


 


Troponin I  0.35    (<0.49)  ng/mL














  01/19/18 01/19/18 01/19/18 Range/Units





  01:10 01:10 01:10 


 


WBC   11.2 H   (4.8-10.8)  x10^3/uL


 


RBC   3.96 L   (4.20-5.40)  10^6/uL


 


Hgb   12.6   (12.0-16.0)  g/dL


 


Hct   37.6   (37.0-47.0)  %


 


MCV   94.9   (81.0-99.0)  fL


 


MCH   31.8 H   (27.0-31.0)  pg


 


MCHC   33.6   (32.0-36.0)  g/dL


 


RDW   13.1   (12.0-15.0)  %


 


Plt Count   184   (130-450)  10^3/uL


 


MPV   7.5 L   (7.9-10.8)  fL


 


Neut #   10.1 H   (1.5-6.6)  10^3/uL


 


Lymph #   0.6 L   (1.5-3.5)  10^3/uL


 


Mono #   0.6   (0.0-1.0)  10^3/uL


 


Eos #   0.0   (0.0-0.7)  10^3/uL


 


Baso #   0.0   (0.0-0.1)  10^3/uL


 


Absolute Nucleated RBC   0.00   x10^3/uL


 


Nucleated RBC %   0.0   /100WBC


 


Bld Gas Analysis Time     


 


Sample Site     


 


ABG pH     (7.35-7.45)  


 


ABG pCO2     (34-45)  mmHg


 


ABG pO2     ()  mmHg


 


ABG HCO3     (22.0-26.0)  mmol/L


 


ABG Total CO2     (21.0-29.0)  MMOL/L


 


ABG O2 Saturation     (94-98)  %


 


ABG Oximetry Spot Check     %


 


ABG Base Excess     (-2.0-3.0)  mmol/L


 


Bandar Test     


 


FiO2     


 


EPAP     cmH2O


 


IPAP     cmH2O


 


Sodium     (135-145)  mmol/L


 


Potassium     (3.5-5.0)  mmol/L


 


Chloride     (101-111)  mmol/L


 


Carbon Dioxide     (21-32)  mmol/L


 


Anion Gap     (6-13)  


 


BUN     (6-20)  mg/dL


 


Creatinine     (0.4-1.0)  mg/dL


 


Estimated GFR (MDRD)     (>89)  


 


Glucose     ()  mg/dL


 


Calcium     (8.5-10.3)  mg/dL


 


Ionized Calcium     


 


Phosphorus  3.4    (2.5-4.6)  mg/dL


 


Magnesium  2.0    (1.7-2.8)  mg/dL


 


Troponin I    0.47  (<0.49)  ng/mL














  01/19/18 Range/Units





  01:10 


 


WBC   (4.8-10.8)  x10^3/uL


 


RBC   (4.20-5.40)  10^6/uL


 


Hgb   (12.0-16.0)  g/dL


 


Hct   (37.0-47.0)  %


 


MCV   (81.0-99.0)  fL


 


MCH   (27.0-31.0)  pg


 


MCHC   (32.0-36.0)  g/dL


 


RDW   (12.0-15.0)  %


 


Plt Count   (130-450)  10^3/uL


 


MPV   (7.9-10.8)  fL


 


Neut #   (1.5-6.6)  10^3/uL


 


Lymph #   (1.5-3.5)  10^3/uL


 


Mono #   (0.0-1.0)  10^3/uL


 


Eos #   (0.0-0.7)  10^3/uL


 


Baso #   (0.0-0.1)  10^3/uL


 


Absolute Nucleated RBC   x10^3/uL


 


Nucleated RBC %   /100WBC


 


Bld Gas Analysis Time   


 


Sample Site   


 


ABG pH   (7.35-7.45)  


 


ABG pCO2   (34-45)  mmHg


 


ABG pO2   ()  mmHg


 


ABG HCO3   (22.0-26.0)  mmol/L


 


ABG Total CO2   (21.0-29.0)  MMOL/L


 


ABG O2 Saturation   (94-98)  %


 


ABG Oximetry Spot Check   %


 


ABG Base Excess   (-2.0-3.0)  mmol/L


 


Bandar Test   


 


FiO2   


 


EPAP   cmH2O


 


IPAP   cmH2O


 


Sodium  132 L  (135-145)  mmol/L


 


Potassium  5.1 H  (3.5-5.0)  mmol/L


 


Chloride  97 L  (101-111)  mmol/L


 


Carbon Dioxide  25  (21-32)  mmol/L


 


Anion Gap  10.0  (6-13)  


 


BUN  18  (6-20)  mg/dL


 


Creatinine  0.7  (0.4-1.0)  mg/dL


 


Estimated GFR (MDRD)  81 L  (>89)  


 


Glucose  132 H  ()  mg/dL


 


Calcium  8.7  (8.5-10.3)  mg/dL


 


Ionized Calcium   


 


Phosphorus   (2.5-4.6)  mg/dL


 


Magnesium   (1.7-2.8)  mg/dL


 


Troponin I   (<0.49)  ng/mL














Assessment/Plan





- Problem List


(1) Severe chronic obstructive pulmonary disease


Impression: 


The patient remains BiPAP dependent and although she is breathing a little bit 

easier today she can only remain off of the BiPAP for a couple of minutes after 

being premedicated with morphine.The patient has discussed with me at length 

her desire to not be intubated and that if she cannot come off of the BiPAP she 

would like to die here at the hospital.








(2) Depression with anxiety


Impression: 


Well managed, continue present care.

## 2018-01-19 NOTE — XRAY REPORT
EXAM: 

CHEST RADIOGRAPHY

 

EXAM DATE: 1/19/2018 06:23 AM.

 

CLINICAL HISTORY: Shortness of breath.

 

COMPARISON: 01/17/2018.

 

TECHNIQUE: 1 view.

 

FINDINGS:

Lungs/Pleura: Large lung volumes consistent with emphysema. No alveolar consolidation or pleural effu
linnette seen. No pneumothorax.

 

Mediastinum: Within exam limitations, the cardiomediastinal contour is normal.

 

Other: Osteopenia.

 

IMPRESSION: 

1. Emphysema. No acute abnormality seen.

 

[facility]

Referring Provider Line: [phone]

 

SITE ID: [id number]

## 2018-01-20 LAB
ANION GAP SERPL CALCULATED.4IONS-SCNC: 11 MMOL/L (ref 6–13)
BUN SERPL-MCNC: 24 MG/DL (ref 6–20)
CALCIUM UR-MCNC: 9.1 MG/DL (ref 8.5–10.3)
CALCIUM UR-MCNC: 9.1 MG/DL (ref 8.5–10.3)
CHLORIDE SERPL-SCNC: 98 MMOL/L (ref 101–111)
CO2 SERPL-SCNC: 27 MMOL/L (ref 21–32)
CREAT SERPLBLD-SCNC: 0.6 MG/DL (ref 0.4–1)
GFRSERPLBLD MDRD-ARVRAT: 97 ML/MIN/{1.73_M2} (ref 89–?)
GLUCOSE SERPL-MCNC: 135 MG/DL (ref 70–100)
MAGNESIUM SERPL-MCNC: 2.1 MG/DL (ref 1.7–2.8)
PHOSPHATE BLD-MCNC: 3.3 MG/DL (ref 2.5–4.6)
SODIUM SERPLBLD-SCNC: 136 MMOL/L (ref 135–145)

## 2018-01-20 RX ADMIN — SODIUM CHLORIDE SCH MLS/HR: 9 INJECTION, SOLUTION INTRAVENOUS at 12:59

## 2018-01-20 RX ADMIN — METHYLPREDNISOLONE SODIUM SUCCINATE SCH MG: 40 INJECTION, POWDER, FOR SOLUTION INTRAMUSCULAR; INTRAVENOUS at 18:32

## 2018-01-20 RX ADMIN — SODIUM CHLORIDE, PRESERVATIVE FREE SCH: 5 INJECTION INTRAVENOUS at 14:02

## 2018-01-20 RX ADMIN — SODIUM CHLORIDE, PRESERVATIVE FREE PRN ML: 5 INJECTION INTRAVENOUS at 18:33

## 2018-01-20 RX ADMIN — MORPHINE SULFATE PRN MG: 2 INJECTION, SOLUTION INTRAMUSCULAR; INTRAVENOUS at 02:16

## 2018-01-20 RX ADMIN — CHLORHEXIDINE GLUCONATE SCH ML: 1.2 SOLUTION ORAL at 05:33

## 2018-01-20 RX ADMIN — SODIUM CHLORIDE SCH MLS/HR: 9 INJECTION, SOLUTION INTRAVENOUS at 00:02

## 2018-01-20 RX ADMIN — SODIUM CHLORIDE, PRESERVATIVE FREE SCH: 5 INJECTION INTRAVENOUS at 05:08

## 2018-01-20 RX ADMIN — DOCUSATE SODIUM SCH: 250 CAPSULE, LIQUID FILLED ORAL at 10:04

## 2018-01-20 RX ADMIN — NICOTINE SCH: 14 PATCH TRANSDERMAL at 10:04

## 2018-01-20 RX ADMIN — MORPHINE SULFATE SCH: 60 TABLET, EXTENDED RELEASE ORAL at 17:17

## 2018-01-20 RX ADMIN — SODIUM CHLORIDE, PRESERVATIVE FREE SCH ML: 5 INJECTION INTRAVENOUS at 20:53

## 2018-01-20 RX ADMIN — IPRATROPIUM BROMIDE AND ALBUTEROL SULFATE SCH ML: 2.5; .5 SOLUTION RESPIRATORY (INHALATION) at 16:40

## 2018-01-20 RX ADMIN — MORPHINE SULFATE PRN MG: 2 INJECTION, SOLUTION INTRAMUSCULAR; INTRAVENOUS at 03:31

## 2018-01-20 RX ADMIN — MORPHINE SULFATE PRN MG: 2 INJECTION, SOLUTION INTRAMUSCULAR; INTRAVENOUS at 08:42

## 2018-01-20 RX ADMIN — CHLORHEXIDINE GLUCONATE SCH: 1.2 SOLUTION ORAL at 19:31

## 2018-01-20 RX ADMIN — MORPHINE SULFATE SCH: 60 TABLET, EXTENDED RELEASE ORAL at 20:53

## 2018-01-20 RX ADMIN — SODIUM CHLORIDE, PRESERVATIVE FREE PRN ML: 5 INJECTION INTRAVENOUS at 06:17

## 2018-01-20 RX ADMIN — IPRATROPIUM BROMIDE AND ALBUTEROL SULFATE SCH ML: 2.5; .5 SOLUTION RESPIRATORY (INHALATION) at 09:00

## 2018-01-20 RX ADMIN — MORPHINE SULFATE PRN MG: 2 INJECTION, SOLUTION INTRAMUSCULAR; INTRAVENOUS at 09:37

## 2018-01-20 RX ADMIN — MORPHINE SULFATE SCH MG: 2 INJECTION, SOLUTION INTRAMUSCULAR; INTRAVENOUS at 20:52

## 2018-01-20 RX ADMIN — METHYLPREDNISOLONE SODIUM SUCCINATE SCH MG: 40 INJECTION, POWDER, FOR SOLUTION INTRAMUSCULAR; INTRAVENOUS at 06:18

## 2018-01-20 RX ADMIN — IPRATROPIUM BROMIDE AND ALBUTEROL SULFATE SCH ML: 2.5; .5 SOLUTION RESPIRATORY (INHALATION) at 19:50

## 2018-01-20 RX ADMIN — MORPHINE SULFATE PRN MG: 2 INJECTION, SOLUTION INTRAMUSCULAR; INTRAVENOUS at 06:20

## 2018-01-20 RX ADMIN — MORPHINE SULFATE PRN MG: 2 INJECTION, SOLUTION INTRAMUSCULAR; INTRAVENOUS at 07:12

## 2018-01-20 RX ADMIN — IPRATROPIUM BROMIDE AND ALBUTEROL SULFATE SCH ML: 2.5; .5 SOLUTION RESPIRATORY (INHALATION) at 05:45

## 2018-01-20 RX ADMIN — CHLORHEXIDINE GLUCONATE SCH: 1.2 SOLUTION ORAL at 10:04

## 2018-01-20 RX ADMIN — METHYLPREDNISOLONE SODIUM SUCCINATE SCH MG: 40 INJECTION, POWDER, FOR SOLUTION INTRAMUSCULAR; INTRAVENOUS at 12:49

## 2018-01-20 RX ADMIN — METHYLPREDNISOLONE SODIUM SUCCINATE SCH MG: 40 INJECTION, POWDER, FOR SOLUTION INTRAMUSCULAR; INTRAVENOUS at 00:03

## 2018-01-20 RX ADMIN — IPRATROPIUM BROMIDE AND ALBUTEROL SULFATE SCH: 2.5; .5 SOLUTION RESPIRATORY (INHALATION) at 16:40

## 2018-01-20 RX ADMIN — MORPHINE SULFATE PRN MG: 2 INJECTION, SOLUTION INTRAMUSCULAR; INTRAVENOUS at 16:43

## 2018-01-20 NOTE — PROVIDER PROGRESS NOTE
Subjective





- Prog Note Date


Prog Note Date: 01/20/18


Prog Note Time: 15:39





- Subjective


Pt reports feeling: Improved (The patient is sedated due to administration of 

Lorazepam but is off of the BiPAP at this time and breathing easily with an 

oxygen saturation of 99% on 6 L via nasal cannula.)





Current Medications





- Current Medications


Current Medications: 


DuoNeb, aspirin, Peridex, Atrovent, Solu-Medrol, morphine, NicoDerm, Compazine, 

sodium chloride








Objective





- Vital Signs/Intake & Output


Reviewed Vital Signs: Yes


Vital Signs: 





 Vital Signs











  Pulse Resp BP Pulse Ox


 


 01/20/18 15:00  94  17  106/70  99


 


 01/20/18 14:00  94  15  107/71  99


 


 01/20/18 13:05  94  15  105/71  99


 


 01/20/18 12:00  92  12  96/73  99











Intake & Output: 





 Intake & Output











 01/17/18 01/18/18 01/19/18 01/20/18





 23:59 23:59 23:59 23:59


 


Intake Total 2040.000 3713.950 3418.206 1123.800


 


Output Total 125 875 475 200


 


Balance 6512.061 3695.950 2943.206 923.800














- Objective


General Appearance: positive: No acute distress, Lethargic


Eyes Bilateral: positive: Normal inspection, PERRL, EOMI


ENT: positive: ENT inspection nml, Pharynx nml, No signs of dehydration


Neck: positive: Nml inspection, Thyroid nml, No JVD, Trachea midline.  negative

: Thyromegaly


Respiratory: positive: Chest non-tender, No respiratory distress, Breath sounds 

nml.  negative: Wheezes, Rales, Rhonchi


Cardiovascular: positive: Regular rate & rhythm, No murmur, No gallop


Abdomen: positive: Non-tender, No organomegaly, Nml bowel sounds, No 

distention.  negative: Tenderness


Back: positive: Nml inspection.  negative: CVA tenderness (R), CVA tenderness (L

)


Skin: positive: Color nml, No rash, Warm, Dry.  negative: Cyanosis


Extremities: positive: Non-tender, Full ROM, Nml appearance, No pedal edema


Neurologic/Psychiatric: positive: Other (Patient is sedated and lethargic)





- Lab Results


Fish Bones: 


 01/19/18 01:10





 01/20/18 05:30


Other Labs: 





 Lab Results x24hrs











  01/20/18 01/20/18 Range/Units





  05:30 05:30 


 


Sodium   136  (135-145)  mmol/L


 


Potassium   4.9  (3.5-5.0)  mmol/L


 


Chloride   98 L  (101-111)  mmol/L


 


Carbon Dioxide   27  (21-32)  mmol/L


 


Anion Gap   11.0  (6-13)  


 


BUN   24 H  (6-20)  mg/dL


 


Creatinine   0.6  (0.4-1.0)  mg/dL


 


Estimated GFR (MDRD)   97  (>89)  


 


Glucose   135 H  ()  mg/dL


 


Calcium  9.1  9.1  (8.5-10.3)  mg/dL


 


Ionized Calcium  NO   


 


Phosphorus   3.3  (2.5-4.6)  mg/dL


 


Magnesium   2.1  (1.7-2.8)  mg/dL














Assessment/Plan





- Problem List


(1) Severe chronic obstructive pulmonary disease


Impression: 


The patient is off of BiPAP and breathing easily on 2 L/min supplemental oxygen 

via nasal cannula.  Her oxygen saturation is 99% and her breathing rate is 14 

at this time.  She is also sedated and has been given multiple doses of 

morphine for her air hunger.Had a greater than 30 minute discussion with family 

and friends today regarding advanced care directives and advanced care 

planning.  They wish to have the patient transferred to [facility] for end-of-

life care at this time. A consultation has been placed with hospice and with 

[facility].








(2) Depression with anxiety


Impression: 


Continue present care.

## 2018-01-21 VITALS — DIASTOLIC BLOOD PRESSURE: 66 MMHG | SYSTOLIC BLOOD PRESSURE: 101 MMHG

## 2018-01-21 LAB
ANION GAP SERPL CALCULATED.4IONS-SCNC: 6 MMOL/L (ref 6–13)
BUN SERPL-MCNC: 27 MG/DL (ref 6–20)
CALCIUM UR-MCNC: 8.7 MG/DL (ref 8.5–10.3)
CHLORIDE SERPL-SCNC: 100 MMOL/L (ref 101–111)
CO2 SERPL-SCNC: 31 MMOL/L (ref 21–32)
CREAT SERPLBLD-SCNC: 0.6 MG/DL (ref 0.4–1)
GFRSERPLBLD MDRD-ARVRAT: 97 ML/MIN/{1.73_M2} (ref 89–?)
GLUCOSE SERPL-MCNC: 112 MG/DL (ref 70–100)
MAGNESIUM SERPL-MCNC: 2.3 MG/DL (ref 1.7–2.8)
PHOSPHATE BLD-MCNC: 4.5 MG/DL (ref 2.5–4.6)
SODIUM SERPLBLD-SCNC: 137 MMOL/L (ref 135–145)

## 2018-01-21 RX ADMIN — METHYLPREDNISOLONE SODIUM SUCCINATE SCH MG: 40 INJECTION, POWDER, FOR SOLUTION INTRAMUSCULAR; INTRAVENOUS at 00:39

## 2018-01-21 RX ADMIN — MORPHINE SULFATE PRN MG: 2 INJECTION, SOLUTION INTRAMUSCULAR; INTRAVENOUS at 15:00

## 2018-01-21 RX ADMIN — SODIUM CHLORIDE, PRESERVATIVE FREE PRN ML: 5 INJECTION INTRAVENOUS at 00:41

## 2018-01-21 RX ADMIN — MORPHINE SULFATE SCH MG: 2 INJECTION, SOLUTION INTRAMUSCULAR; INTRAVENOUS at 06:08

## 2018-01-21 RX ADMIN — METHYLPREDNISOLONE SODIUM SUCCINATE SCH MG: 40 INJECTION, POWDER, FOR SOLUTION INTRAMUSCULAR; INTRAVENOUS at 12:15

## 2018-01-21 RX ADMIN — MORPHINE SULFATE PRN MG: 2 INJECTION, SOLUTION INTRAMUSCULAR; INTRAVENOUS at 12:08

## 2018-01-21 RX ADMIN — MORPHINE SULFATE SCH MG: 2 INJECTION, SOLUTION INTRAMUSCULAR; INTRAVENOUS at 09:19

## 2018-01-21 RX ADMIN — MORPHINE SULFATE SCH: 60 TABLET, EXTENDED RELEASE ORAL at 09:22

## 2018-01-21 RX ADMIN — MORPHINE SULFATE PRN MG: 2 INJECTION, SOLUTION INTRAMUSCULAR; INTRAVENOUS at 14:01

## 2018-01-21 RX ADMIN — SODIUM CHLORIDE, PRESERVATIVE FREE SCH ML: 5 INJECTION INTRAVENOUS at 14:03

## 2018-01-21 RX ADMIN — NICOTINE SCH: 14 PATCH TRANSDERMAL at 09:21

## 2018-01-21 RX ADMIN — SODIUM CHLORIDE, PRESERVATIVE FREE PRN ML: 5 INJECTION INTRAVENOUS at 11:10

## 2018-01-21 RX ADMIN — DOCUSATE SODIUM SCH: 250 CAPSULE, LIQUID FILLED ORAL at 09:21

## 2018-01-21 RX ADMIN — CHLORHEXIDINE GLUCONATE SCH ML: 1.2 SOLUTION ORAL at 08:13

## 2018-01-21 RX ADMIN — SODIUM CHLORIDE, PRESERVATIVE FREE SCH ML: 5 INJECTION INTRAVENOUS at 06:08

## 2018-01-21 RX ADMIN — METHYLPREDNISOLONE SODIUM SUCCINATE SCH MG: 40 INJECTION, POWDER, FOR SOLUTION INTRAMUSCULAR; INTRAVENOUS at 06:08

## 2018-01-21 RX ADMIN — MORPHINE SULFATE SCH MG: 2 INJECTION, SOLUTION INTRAMUSCULAR; INTRAVENOUS at 00:40

## 2018-01-21 RX ADMIN — SODIUM CHLORIDE, PRESERVATIVE FREE PRN ML: 5 INJECTION INTRAVENOUS at 14:02

## 2018-01-21 RX ADMIN — SODIUM CHLORIDE, PRESERVATIVE FREE PRN ML: 5 INJECTION INTRAVENOUS at 09:20

## 2018-01-21 RX ADMIN — MORPHINE SULFATE SCH MG: 2 INJECTION, SOLUTION INTRAMUSCULAR; INTRAVENOUS at 13:06

## 2018-01-21 RX ADMIN — MORPHINE SULFATE PRN MG: 2 INJECTION, SOLUTION INTRAMUSCULAR; INTRAVENOUS at 11:10
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
## 2018-01-21 NOTE — DISCHARGE PLAN
Discharge Plan


Disposition: 20 


No Smoking: If you smoke, Please STOP!  Call [phone] for help.